# Patient Record
Sex: MALE | Race: WHITE | Employment: FULL TIME | URBAN - METROPOLITAN AREA
[De-identification: names, ages, dates, MRNs, and addresses within clinical notes are randomized per-mention and may not be internally consistent; named-entity substitution may affect disease eponyms.]

---

## 2023-01-16 ENCOUNTER — HOSPITAL ENCOUNTER (EMERGENCY)
Age: 63
Discharge: HOME | End: 2023-01-16
Payer: COMMERCIAL

## 2023-01-16 ENCOUNTER — APPOINTMENT (OUTPATIENT)
Dept: CT IMAGING | Age: 63
End: 2023-01-16
Payer: COMMERCIAL

## 2023-01-16 VITALS
TEMPERATURE: 98.1 F | OXYGEN SATURATION: 96 % | DIASTOLIC BLOOD PRESSURE: 66 MMHG | SYSTOLIC BLOOD PRESSURE: 136 MMHG | HEART RATE: 70 BPM | RESPIRATION RATE: 16 BRPM

## 2023-01-16 DIAGNOSIS — S30.1XXA CONTUSION OF FLANK AND BACK, INITIAL ENCOUNTER: Primary | ICD-10-CM

## 2023-01-16 LAB
A/G RATIO: 1.3 (ref 1.1–2.2)
ALBUMIN SERPL-MCNC: 4.1 G/DL (ref 3.4–5)
ALP BLD-CCNC: 74 U/L (ref 40–129)
ALT SERPL-CCNC: 19 U/L (ref 10–40)
ANION GAP SERPL CALCULATED.3IONS-SCNC: 9 MMOL/L (ref 3–16)
AST SERPL-CCNC: 21 U/L (ref 15–37)
BACTERIA: ABNORMAL /HPF
BASOPHILS ABSOLUTE: 0 K/UL (ref 0–0.2)
BASOPHILS RELATIVE PERCENT: 0.4 %
BILIRUB SERPL-MCNC: 0.3 MG/DL (ref 0–1)
BILIRUBIN URINE: NEGATIVE
BLOOD, URINE: NEGATIVE
BUN BLDV-MCNC: 22 MG/DL (ref 7–20)
CALCIUM SERPL-MCNC: 8.8 MG/DL (ref 8.3–10.6)
CHLORIDE BLD-SCNC: 105 MMOL/L (ref 99–110)
CLARITY: CLEAR
CO2: 29 MMOL/L (ref 21–32)
COLOR: YELLOW
CREAT SERPL-MCNC: 0.9 MG/DL (ref 0.8–1.3)
EOSINOPHILS ABSOLUTE: 0.1 K/UL (ref 0–0.6)
EOSINOPHILS RELATIVE PERCENT: 1 %
EPITHELIAL CELLS, UA: 1 /HPF (ref 0–5)
GFR SERPL CREATININE-BSD FRML MDRD: >60 ML/MIN/{1.73_M2}
GLUCOSE BLD-MCNC: 122 MG/DL (ref 70–99)
GLUCOSE URINE: NEGATIVE MG/DL
HCT VFR BLD CALC: 44.4 % (ref 40.5–52.5)
HEMOGLOBIN: 14.8 G/DL (ref 13.5–17.5)
HYALINE CASTS: 2 /LPF (ref 0–8)
KETONES, URINE: ABNORMAL MG/DL
LEUKOCYTE ESTERASE, URINE: ABNORMAL
LYMPHOCYTES ABSOLUTE: 1.3 K/UL (ref 1–5.1)
LYMPHOCYTES RELATIVE PERCENT: 14.5 %
MCH RBC QN AUTO: 30.6 PG (ref 26–34)
MCHC RBC AUTO-ENTMCNC: 33.4 G/DL (ref 31–36)
MCV RBC AUTO: 91.5 FL (ref 80–100)
MICROSCOPIC EXAMINATION: YES
MONOCYTES ABSOLUTE: 0.7 K/UL (ref 0–1.3)
MONOCYTES RELATIVE PERCENT: 7.8 %
NEUTROPHILS ABSOLUTE: 6.7 K/UL (ref 1.7–7.7)
NEUTROPHILS RELATIVE PERCENT: 76.3 %
NITRITE, URINE: NEGATIVE
PDW BLD-RTO: 14.4 % (ref 12.4–15.4)
PH UA: 5.5 (ref 5–8)
PLATELET # BLD: 212 K/UL (ref 135–450)
PMV BLD AUTO: 9 FL (ref 5–10.5)
POTASSIUM REFLEX MAGNESIUM: 4.8 MMOL/L (ref 3.5–5.1)
PROTEIN UA: ABNORMAL MG/DL
RBC # BLD: 4.86 M/UL (ref 4.2–5.9)
RBC UA: 1 /HPF (ref 0–4)
SODIUM BLD-SCNC: 143 MMOL/L (ref 136–145)
SPECIFIC GRAVITY UA: >=1.03 (ref 1–1.03)
TOTAL PROTEIN: 7.2 G/DL (ref 6.4–8.2)
URINE REFLEX TO CULTURE: ABNORMAL
URINE TYPE: ABNORMAL
UROBILINOGEN, URINE: 0.2 E.U./DL
WBC # BLD: 8.7 K/UL (ref 4–11)
WBC UA: 7 /HPF (ref 0–5)

## 2023-01-16 PROCEDURE — 99285 EMERGENCY DEPT VISIT HI MDM: CPT

## 2023-01-16 PROCEDURE — 85025 COMPLETE CBC W/AUTO DIFF WBC: CPT

## 2023-01-16 PROCEDURE — 74177 CT ABD & PELVIS W/CONTRAST: CPT

## 2023-01-16 PROCEDURE — 6370000000 HC RX 637 (ALT 250 FOR IP): Performed by: PHYSICIAN ASSISTANT

## 2023-01-16 PROCEDURE — 81001 URINALYSIS AUTO W/SCOPE: CPT

## 2023-01-16 PROCEDURE — 80053 COMPREHEN METABOLIC PANEL: CPT

## 2023-01-16 PROCEDURE — 36415 COLL VENOUS BLD VENIPUNCTURE: CPT

## 2023-01-16 PROCEDURE — 6360000004 HC RX CONTRAST MEDICATION: Performed by: PHYSICIAN ASSISTANT

## 2023-01-16 RX ORDER — DICLOFENAC SODIUM 75 MG/1
75 TABLET, DELAYED RELEASE ORAL 2 TIMES DAILY PRN
Qty: 20 TABLET | Refills: 0 | Status: SHIPPED | OUTPATIENT
Start: 2023-01-16

## 2023-01-16 RX ORDER — CYCLOBENZAPRINE HCL 10 MG
10 TABLET ORAL 3 TIMES DAILY PRN
Qty: 21 TABLET | Refills: 0 | Status: SHIPPED | OUTPATIENT
Start: 2023-01-16 | End: 2023-01-26

## 2023-01-16 RX ORDER — OXYCODONE HYDROCHLORIDE AND ACETAMINOPHEN 5; 325 MG/1; MG/1
1 TABLET ORAL ONCE
Status: COMPLETED | OUTPATIENT
Start: 2023-01-16 | End: 2023-01-16

## 2023-01-16 RX ADMIN — OXYCODONE AND ACETAMINOPHEN 1 TABLET: 5; 325 TABLET ORAL at 10:07

## 2023-01-16 RX ADMIN — IOPAMIDOL 75 ML: 755 INJECTION, SOLUTION INTRAVENOUS at 10:57

## 2023-01-16 ASSESSMENT — LIFESTYLE VARIABLES
HOW OFTEN DO YOU HAVE A DRINK CONTAINING ALCOHOL: NEVER
HOW MANY STANDARD DRINKS CONTAINING ALCOHOL DO YOU HAVE ON A TYPICAL DAY: PATIENT DOES NOT DRINK

## 2023-01-16 ASSESSMENT — PAIN - FUNCTIONAL ASSESSMENT: PAIN_FUNCTIONAL_ASSESSMENT: 0-10

## 2023-01-16 ASSESSMENT — PAIN SCALES - GENERAL: PAINLEVEL_OUTOF10: 10

## 2023-01-16 NOTE — ED PROVIDER NOTES
[facility]        Pt Name: Adrianan Schreiber  MRN: [id number]  [place] 1960  Date of evaluation: 1/16/2023  Provider: José Manuel Leyva PA-C  PCP: No primary care provider on file. Note Started: 10:04 AM EST 1/16/23      SAMANTHA. I have evaluated this patient. My supervising physician was available for consultation. CHIEF COMPLAINT       Chief Complaint   Patient presents with    Back Pain     Left sided back pain radiating to the front, was tying a rope this AM, rope broke and patient fell backwards from standing position onto concrete       HISTORY OF PRESENT ILLNESS: 1 or more Elements     History From: patient    Adrianna Schreiber is a 57 y.o. male who presents complaining of a left flank/left low back pain this AM.  Patient reports he was tying a rope to secure something when the rope snapped and he fell backward. He states he fell hard, directly onto his left flank on the concrete. He now has severe pain in the area, worse with range of motion, relieved by rest, radiates to the left lower quadrant with some abdominal pain. Denies midline back pain, leg pain, weakness, paresthesia, blood in urine, difficulty urinating or bowel movements. Denies prior back surgery or recent injections. Denies syncope, anticoagulation use, head injury, headache, neck pain. Nursing Notes were all reviewed and agreed with or any disagreements were addressed in the HPI. REVIEW OF SYSTEMS :      Review of Systems   All other systems reviewed and are negative. Positives and Pertinent negatives as per HPI. PAST MEDICAL HISTORY    has no past medical history on file. SURGICAL HISTORY   History reviewed. No pertinent surgical history. [address]       Discharge Medication List as of 1/16/2023  1:01 PM          ALLERGIES     Patient has no known allergies. FAMILYHISTORY     History reviewed. No pertinent family history. SOCIAL HISTORY       Social History     Tobacco Use    Smoking status: Never    Smokeless tobacco: Never       SCREENINGS        Billie Coma Scale  Eye Opening: Spontaneous  Best Verbal Response: Oriented  Best Motor Response: Obeys commands  [place] Coma Scale Score: 15                CIWA Assessment  BP: 136/66  Heart Rate: 70           PHYSICAL EXAM  1 or more Elements     ED Triage Vitals [01/16/23 0923]   BP Temp Temp src Heart Rate Resp SpO2 Height Weight   136/66 98.1 °F (36.7 °C) -- 70 16 96 % -- --       Physical Exam  Vitals and nursing note reviewed. Constitutional:       General: He is not in acute distress. Appearance: He is not ill-appearing or toxic-appearing. HENT:      Head: Normocephalic and atraumatic. Right Ear: External ear normal.      Left Ear: External ear normal.      Nose: Nose normal.      Mouth/Throat:      Mouth: Mucous membranes are moist.      Pharynx: Oropharynx is clear. Eyes:      Extraocular Movements: Extraocular movements intact. Conjunctiva/sclera: Conjunctivae normal.      Pupils: Pupils are equal, round, and reactive to light. Cardiovascular:      Rate and Rhythm: Normal rate and regular rhythm. Pulses: Normal pulses. Heart sounds: Normal heart sounds. Pulmonary:      Effort: Pulmonary effort is normal. No respiratory distress. Breath sounds: Normal breath sounds. Abdominal:      General: Abdomen is flat. Bowel sounds are normal. There is no distension. Palpations: Abdomen is soft. Tenderness: There is abdominal tenderness (LLQ). There is left CVA tenderness. There is no guarding or rebound. Musculoskeletal:         General: Normal range of motion. Cervical back: Normal range of motion and neck supple. Skin:     General: Skin is warm and dry. Capillary Refill: Capillary refill takes less than 2 seconds. Findings: Bruising (left flank) present. Neurological:      General: No focal deficit present. Mental Status: He is alert and oriented to person, place, and time. Cranial Nerves: No cranial nerve deficit. Sensory: No sensory deficit. Motor: No weakness. Coordination: Coordination normal.      Gait: Gait normal.   Psychiatric:         Mood and Affect: Mood normal.         Behavior: Behavior normal.           DIAGNOSTIC RESULTS   LABS:    Labs Reviewed   COMPREHENSIVE METABOLIC PANEL W/ REFLEX TO MG FOR LOW K - Abnormal; Notable for the following components:       Result Value    Glucose 122 (*)     BUN 22 (*)     All other components within normal limits   URINALYSIS WITH REFLEX TO CULTURE - Abnormal; Notable for the following components:    Ketones, Urine TRACE (*)     Protein, UA TRACE (*)     Leukocyte Esterase, Urine SMALL (*)     All other components within normal limits   MICROSCOPIC URINALYSIS - Abnormal; Notable for the following components:    WBC, UA 7 (*)     All other components within normal limits   CBC WITH AUTO DIFFERENTIAL       When ordered only abnormal lab results are displayed. All other labs were within normal range or not returned as of this dictation. EKG: When ordered, EKG's are interpreted by the Emergency Department Physician in the absence of a cardiologist.  Please see their note for interpretation of EKG. RADIOLOGY:   Non-plain film images such as CT, Ultrasound and MRI are read by the radiologist. Plain radiographic images are visualized and preliminarily interpreted by the ED Provider with the below findings:        Interpretation per the Radiologist below, if available at the time of this note:    CT ABDOMEN PELVIS W IV CONTRAST Additional Contrast? None   Final Result   Negative for acute abdominopelvic injury. No results found. No results found.     PROCEDURES   Unless otherwise noted below, none     Procedures    CRITICAL CARE TIME (.cctime)         EMERGENCY DEPARTMENT COURSE and DIFFERENTIAL DIAGNOSIS/MDM:   Vitals:    Vitals: 01/16/23 0923   BP: 136/66   Pulse: 70   Resp: 16   Temp: 98.1 °F (36.7 °C)   SpO2: 96%       Patient was given the following medications:  Medications   oxyCODONE-acetaminophen (PERCOCET) 5-325 MG per tablet 1 tablet (1 tablet Oral Given 1/16/23 1007)   iopamidol (ISOVUE-370) 76 % injection 75 mL (75 mLs IntraVENous Given 1/16/23 1057)             Is this patient to be included in the SEP-1 Core Measure due to severe sepsis or septic shock? No   Exclusion criteria - the patient is NOT to be included for SEP-1 Core Measure due to: Infection is not suspected    Chronic Conditions affecting care:    has no past medical history on file. CONSULTS: (Who and What was discussed)  None          Records Reviewed (Source):     CC/HPI Summary, DDx, ED Course, and Reassessment:     Emelia Aguiar is a 57 y.o. male who presents complaining of a left flank/left low back pain this AM.  Patient reports he was tying a rope to secure something when the rope snapped and he fell backward. He states he fell hard, directly onto his left flank on the concrete. He now has severe pain in the area, worse with range of motion, relieved by rest, radiates to the left lower quadrant with some abdominal pain. Denies midline back pain, leg pain, weakness, paresthesia, blood in urine, difficulty urinating or bowel movements. Denies prior back surgery or recent injections. Denies syncope, anticoagulation use, head injury, headache, neck pain    Shared medical decision making-patient was offered antibiotics for potential UTI with leukocytes in urine. He denies any urinary symptoms and states he was rather not taking antibiotics. Instructed him that if he develops symptoms or fever to return to the ER or follow-up with primary care. CT without acute traumatic finding. CBC, CMP unremarkable. No midline spinal tenderness, radiculopathy, weakness or paresthesia, incontinence, saddle paresthesia.     Patient instructed follow-up with primary care, return for any new or worsening symptoms. Disposition Considerations (tests considered but not done, Admit vs D/C, Shared Decision Making, Pt Expectation of Test or Tx.):        I am the Primary Clinician of Record. FINAL IMPRESSION      1. Contusion of flank and back, initial encounter          DISPOSITION/PLAN     DISPOSITION Decision To Discharge 01/16/2023 12:56:08 PM      PATIENT REFERRED TO:  [address] [place] [address]-Services  [phone]  In 2 days  Follow-up with primary care, they will contact you to set one up. Return for any new or worsening symptoms. DISCHARGE MEDICATIONS:  Discharge Medication List as of 1/16/2023  1:01 PM        START taking these medications    Details   cyclobenzaprine (FLEXERIL) 10 MG tablet Take 1 tablet by mouth 3 times daily as needed for Muscle spasms, Disp-21 tablet, R-0Print      diclofenac (VOLTAREN) 75 MG EC tablet Take 1 tablet by mouth 2 times daily as needed for Pain, Disp-20 tablet, R-0Print             DISCONTINUED MEDICATIONS:  Discharge Medication List as of 1/16/2023  1:01 PM                 (Please note that portions of this note were completed with a voice recognition program.  Efforts were made to edit the dictations but occasionally words are mis-transcribed. )    Nate De La Torre PA-C (electronically signed)            Nate De La Torre PA-C  01/16/23 6882

## 2023-02-28 ENCOUNTER — OFFICE VISIT (OUTPATIENT)
Dept: PRIMARY CARE CLINIC | Age: 63
End: 2023-02-28
Payer: COMMERCIAL

## 2023-02-28 VITALS
BODY MASS INDEX: 28.63 KG/M2 | RESPIRATION RATE: 16 BRPM | WEIGHT: 200 LBS | HEART RATE: 62 BPM | DIASTOLIC BLOOD PRESSURE: 72 MMHG | TEMPERATURE: 97 F | SYSTOLIC BLOOD PRESSURE: 122 MMHG | HEIGHT: 70 IN | OXYGEN SATURATION: 97 %

## 2023-02-28 DIAGNOSIS — K44.9 HIATAL HERNIA: ICD-10-CM

## 2023-02-28 DIAGNOSIS — Z12.83 SKIN CANCER SCREENING: ICD-10-CM

## 2023-02-28 DIAGNOSIS — K40.90 INDIRECT INGUINAL HERNIA: Primary | ICD-10-CM

## 2023-02-28 PROCEDURE — 1123F ACP DISCUSS/DSCN MKR DOCD: CPT | Performed by: FAMILY MEDICINE

## 2023-02-28 PROCEDURE — 99203 OFFICE O/P NEW LOW 30 MIN: CPT | Performed by: FAMILY MEDICINE

## 2023-02-28 SDOH — ECONOMIC STABILITY: INCOME INSECURITY: HOW HARD IS IT FOR YOU TO PAY FOR THE VERY BASICS LIKE FOOD, HOUSING, MEDICAL CARE, AND HEATING?: NOT HARD AT ALL

## 2023-02-28 SDOH — ECONOMIC STABILITY: HOUSING INSECURITY
IN THE LAST 12 MONTHS, WAS THERE A TIME WHEN YOU DID NOT HAVE A STEADY PLACE TO SLEEP OR SLEPT IN A SHELTER (INCLUDING NOW)?: NO

## 2023-02-28 SDOH — ECONOMIC STABILITY: FOOD INSECURITY: WITHIN THE PAST 12 MONTHS, YOU WORRIED THAT YOUR FOOD WOULD RUN OUT BEFORE YOU GOT MONEY TO BUY MORE.: NEVER TRUE

## 2023-02-28 SDOH — ECONOMIC STABILITY: FOOD INSECURITY: WITHIN THE PAST 12 MONTHS, THE FOOD YOU BOUGHT JUST DIDN'T LAST AND YOU DIDN'T HAVE MONEY TO GET MORE.: NEVER TRUE

## 2023-02-28 ASSESSMENT — PATIENT HEALTH QUESTIONNAIRE - PHQ9
SUM OF ALL RESPONSES TO PHQ QUESTIONS 1-9: 0
SUM OF ALL RESPONSES TO PHQ QUESTIONS 1-9: 0
SUM OF ALL RESPONSES TO PHQ9 QUESTIONS 1 & 2: 0
1. LITTLE INTEREST OR PLEASURE IN DOING THINGS: 0
SUM OF ALL RESPONSES TO PHQ QUESTIONS 1-9: 0
SUM OF ALL RESPONSES TO PHQ QUESTIONS 1-9: 0
2. FEELING DOWN, DEPRESSED OR HOPELESS: 0

## 2023-02-28 ASSESSMENT — ENCOUNTER SYMPTOMS
VOMITING: 0
DIARRHEA: 0
ABDOMINAL PAIN: 0
BLOOD IN STOOL: 1
SORE THROAT: 0
SHORTNESS OF BREATH: 0
NAUSEA: 0
COUGH: 0

## 2023-02-28 NOTE — PROGRESS NOTES
Chief Complaint   Patient presents with    New Patient     Np to establish     Other     Pt is c/o a knot on his groin x 6 months. Pt stated that it is not painful          Verneita Sis is a 70 y.o. male who presents complaining of nonpainful lump in his R groin x 6 months. Pt does do laborous work lifting heavy objects    Of note pt was in the ER last month secondary to an accidental fall while pulling a rope and hitting his lower back and in the ER had a CT scan abdomen/pelvis done which did show a R inguinal hernia    Pt denies any hx of HTN, diabetes or asthma     Surgical hx include hemorrhoid surgery    Pt is nonsmoker and denies any alcohol or illegal drug use    Pt did complete his COVID vaccine series    FHx positive for hiatal hernia (father); FHx negative for diabetes, CAD or colon/prostate cancer        CT scan abdomen/pelvis 01/16/2023  FINDINGS:   Lower Chest: There is a moderate-sized sliding-type hiatal hernia. There is   no consolidation or effusion. Organs: The liver, pancreas, spleen, kidneys and adrenals are unremarkable. GI/Bowel: There is no bowel dilatation, wall thickening or obstruction. Diverticular changes are scattered throughout the left hemicolon. The   appendix is normal.       Pelvis: The bladder and prostate are unremarkable. There is a 5.4 cm   indirect right inguinal hernia which contains the rightward aspect of the   bladder dome. Peritoneum/Retroperitoneum: There is no free air, free fluid or   intraperitoneal inflammatory change. There is no adenopathy. Bones/Soft Tissues: There is no acute fracture or aggressive osseous lesion. Review of Systems   Constitutional:  Negative for fatigue and fever. HENT:  Negative for nosebleeds and sore throat. Eyes:         Negative blurred vision or diplopia   Respiratory:  Negative for cough and shortness of breath. Cardiovascular:  Negative for chest pain, palpitations and leg swelling. Gastrointestinal:  Positive for blood in stool (at times). Negative for abdominal pain, diarrhea, nausea and vomiting. Negative melena; Positive indigestion at times with certain foods   Endocrine: Negative for polydipsia and polyuria. Genitourinary:  Negative for dysuria and hematuria. Musculoskeletal:  Positive for arthralgias (B/L knees and feet and takes OTC ibuprofen at times). Skin:  Negative for rash. Neurological:  Negative for dizziness, seizures, syncope, speech difficulty, weakness and headaches. Psychiatric/Behavioral:  Negative for dysphoric mood. The patient is not nervous/anxious. Vitals:    02/28/23 1059   BP: 122/72   Pulse: 62   Resp: 16   Temp: 97 °F (36.1 °C)   SpO2: 97%         Physical Exam  Vitals reviewed. Constitutional:       General: He is not in acute distress. HENT:      Mouth/Throat:      Mouth: Mucous membranes are moist.      Pharynx: Oropharynx is clear. Eyes:      General: No scleral icterus. Comments: Pink conjunctivae    Neck:      Thyroid: No thyromegaly. Comments: No carotid bruits noted B/L  Cardiovascular:      Heart sounds: Normal heart sounds. No murmur heard. No friction rub. No gallop. Pulmonary:      Effort: Pulmonary effort is normal.      Breath sounds: Normal breath sounds. Abdominal:      Palpations: Abdomen is soft. Tenderness: There is no abdominal tenderness. Genitourinary:     Penis: Normal.       Comments: No testicular masses noted; Positive moderate nontender R indirect inguinal hernia noted; No inguinal adenopathy noted B/L  Musculoskeletal:      Comments: No leg edema noted B/L   Lymphadenopathy:      Cervical: No cervical adenopathy. Skin:     Findings: No rash. Comments: Positive multiple sun spots and SK's noted on neck and B/L arms   Neurological:      Mental Status: He is alert.       Comments: Cranial nerves 2 - 12 grossly intact; Muscle strength 5/5 throughout   Psychiatric:         Mood and Affect: Mood normal.       ASSESSMENT AND PLAN    1. Indirect inguinal hernia  Pt with no associated tendernous to palpation and will Refer pt to Surgery for surgical repair and pt was told to go to the ER ASAP if you develop any acute pain at the site or nausea/vomiting! - Elli Henry MD, General Surgery, [facility]    2. Skin cancer screening  Will Refer pt to Dermatology for skin cancer screening and patient was told to always use sunscreen and a sunhat when exposed to the sun for extended periods   - Latosha Hooper MD, Dermatology, [facility]    3. Hiatal hernia  Pt denies any associated indigestion or chest pain      General Hayes MD      Return in about 2 months (around 4/28/2023).

## 2023-03-07 ENCOUNTER — OFFICE VISIT (OUTPATIENT)
Dept: SURGERY | Age: 63
End: 2023-03-07
Payer: COMMERCIAL

## 2023-03-07 VITALS — BODY MASS INDEX: 28.84 KG/M2 | WEIGHT: 201 LBS | DIASTOLIC BLOOD PRESSURE: 67 MMHG | SYSTOLIC BLOOD PRESSURE: 123 MMHG

## 2023-03-07 DIAGNOSIS — K40.20 BILATERAL INGUINAL HERNIA WITHOUT OBSTRUCTION OR GANGRENE, RECURRENCE NOT SPECIFIED: Primary | ICD-10-CM

## 2023-03-07 PROCEDURE — 99203 OFFICE O/P NEW LOW 30 MIN: CPT | Performed by: SURGERY

## 2023-03-07 ASSESSMENT — ENCOUNTER SYMPTOMS
CHEST TIGHTNESS: 0
APNEA: 0
EYE DISCHARGE: 0
ABDOMINAL PAIN: 1
ABDOMINAL DISTENTION: 0
BACK PAIN: 0
COLOR CHANGE: 0
EYE ITCHING: 0

## 2023-03-07 NOTE — PROGRESS NOTES
[facility]      PATIENT NAME: Jaxon Marroquin     TODAY'S DATE: 3/7/2023    Reason for Consult:  Hernia    Requesting Physician:  Dr. Perla Dandy:              The patient is a 70 y.o. male who presents with concerns of a groin bulge, RLQ, present about 6 mos, getting larger, more tender with local pressure recently. He has had symptoms for the past few weeks mostly. Associated symptoms are swelling and an enlarging bulge. The patient has not had prior hernia surgery. Lifts regularly, works as a . Past Medical History:    No past medical history on file. Past Surgical History:    No past surgical history on file. Current Medications:   No current facility-administered medications for this visit. Prior to Admission medications    Not on File        Allergies:  Patient has no known allergies. Social History:    reports that he has never smoked. He has never used smokeless tobacco. He reports that he does not drink alcohol and does not use drugs. Family History:        Problem Relation Age of Onset    No Known Problems Mother        REVIEW OF SYSTEMS:  Review of Systems   Constitutional:  Negative for activity change and appetite change. HENT:  Negative for congestion and dental problem. Eyes:  Negative for discharge and itching. Respiratory:  Negative for apnea and chest tightness. Cardiovascular:  Negative for chest pain and leg swelling. Gastrointestinal:  Positive for abdominal pain. Negative for abdominal distention. Endocrine: Negative for cold intolerance and heat intolerance. Genitourinary:  Negative for difficulty urinating and dysuria. Musculoskeletal:  Negative for arthralgias and back pain. Skin:  Negative for color change and pallor. Allergic/Immunologic: Negative for environmental allergies and food allergies. Neurological:  Negative for dizziness and facial asymmetry. Hematological:  Negative for adenopathy. Does not bruise/bleed easily. Psychiatric/Behavioral:  Negative for agitation and behavioral problems.       PHYSICAL EXAM:  VITALS:  Wt 201 lb (91.2 kg)   BMI 28.84 kg/m²   CONSTITUTIONAL:  alert, no apparent distress and normal weight  EYES:  sclera clear  ENT:  normocepalic, without obvious abnormality  NECK:  supple, symmetrical, trachea midline  LUNGS:  clear to auscultation  CARDIOVASCULAR:  regular rate and rhythm  ABDOMEN:  no scars, normal bowel sounds, soft, non-distended, non-tender, voluntary guarding absent, no masses palpated, no hepatosplenomegally and hernia present - bilateral inguinal, right larger  : normal scrotum and testicles  MUSCULOSKELETAL:  0+ edema lower extremities  NEUROLOGIC:  Mental Status Exam:  Level of Alertness:   awake  Orientation:   person, place, time  SKIN:  no bruising or bleeding and normal skin color, texture, turgor    DATA:     Latest Reference Range & Units 1/16/23 10:10   Sodium 136 - 145 mmol/L 143   Potassium 3.5 - 5.1 mmol/L 4.8   Chloride 99 - 110 mmol/L 105   CO2 21 - 32 mmol/L 29   BUN,BUNPL 7 - 20 mg/dL 22 (H)   Creatinine 0.8 - 1.3 mg/dL 0.9   Anion Gap 3 - 16  9   Est, Glom Filt Rate >60  >60   Glucose, Random 70 - 99 mg/dL 122 (H)   CALCIUM, SERUM, 527721 8.3 - 10.6 mg/dL 8.8   Total Protein 6.4 - 8.2 g/dL 7.2   Albumin 3.4 - 5.0 g/dL 4.1   Albumin/Globulin Ratio 1.1 - 2.2  1.3   Alk Phos 40 - 129 U/L 74   ALT 10 - 40 U/L 19   AST 15 - 37 U/L 21   BILIRUBIN TOTAL 0.0 - 1.0 mg/dL 0.3   WBC 4.0 - 11.0 K/uL 8.7   RBC 4.20 - 5.90 M/uL 4.86   Hemoglobin Quant 13.5 - 17.5 g/dL 14.8   Hematocrit 40.5 - 52.5 % 44.4   MCV 80.0 - 100.0 fL 91.5   MCH 26.0 - 34.0 pg 30.6   MCHC 31.0 - 36.0 g/dL 33.4   MPV 5.0 - 10.5 fL 9.0   RDW 12.4 - 15.4 % 14.4   Platelet Count 067 - 450 K/uL 212   (H): Data is abnormally high    Radiology Review:    EXAMINATION:   CT OF THE ABDOMEN AND PELVIS WITH CONTRAST 1/16/2023 10:44 am TECHNIQUE:   CT of the abdomen and pelvis was performed with the administration of   intravenous contrast. Multiplanar reformatted images are provided for review. Automated exposure control, iterative reconstruction, and/or weight based   adjustment of the mA/kV was utilized to reduce the radiation dose to as low   as reasonably achievable. COMPARISON:   None. HISTORY:   ORDERING SYSTEM PROVIDED HISTORY: fall, left flank pain, LLQ pain   TECHNOLOGIST PROVIDED HISTORY:   Additional Contrast?->None   Reason for exam:->fall, left flank pain, LLQ pain   Decision Support Exception - unselect if not a suspected or confirmed   emergency medical condition->Emergency Medical Condition (MA)   Reason for Exam: fall, left flank pain, LLQ pain       FINDINGS:   Lower Chest: There is a moderate-sized sliding-type hiatal hernia. There is   no consolidation or effusion. Organs: The liver, pancreas, spleen, kidneys and adrenals are unremarkable. GI/Bowel: There is no bowel dilatation, wall thickening or obstruction. Diverticular changes are scattered throughout the left hemicolon. The   appendix is normal.       Pelvis: The bladder and prostate are unremarkable. There is a 5.4 cm   indirect right inguinal hernia which contains the rightward aspect of the   bladder dome. Peritoneum/Retroperitoneum: There is no free air, free fluid or   intraperitoneal inflammatory change. There is no adenopathy. Bones/Soft Tissues: There is no acute fracture or aggressive osseous lesion. Impression   Negative for acute abdominopelvic injury. IMPRESSION/RECOMMENDATIONS:    Bilateral inguinal hernia. Right symptomatic. Laparoscopic Bilateral inguinal hernia repair will be scheduled. The plan for surgery has been reviewed in detail today. Risks and benefits have been reviewed, and all questions answered.     Will schedule as outpt at [facility], thank you       Danielle Medrano MD

## 2023-03-13 NOTE — PROGRESS NOTES
Name_______________________________________Printed:____________________  Date and time of surgery____3/17/2023   1130____________________Arrival Time:____1000  MAIN____________   1. The instructions given regarding when and if a patient needs to stop oral intake prior to surgery varies. Follow the specific instructions you were given                  __X_Nothing to eat or to drink after Midnight the night before.                   ____Carbo loading or instructions will be given to select patients-if you have been given those instructions -please do the following                           The evening before your surgery after dinner before midnight drink 40 ounces of gatorade. If you are diabetic use sugar free. The morning of surgery drink 40 ounces of water. This needs to be finished 3 hours prior to your surgery start time. 2. Take the following pills with a small sip of water on the morning of surgery___________________________________________________                  Do not take blood pressure medications ending in pril or sartan the john prior to surgery or the morning of surgery. Dr Jose Wolfe patient are not to take any medications the AM of surgery. 3. Aspirin, Ibuprofen, Advil, Naproxen, Vitamin E and other Anti-inflammatory products and supplements should be stopped for 5 -7days before surgery or as directed by your physician. 4. Check with your Doctor regarding stopping Plavix, Coumadin,Eliquis, Lovenox,Effient,Pradaxa,Xarelto, Fragmin or other blood thinners and follow their instructions. 5. Do not smoke, and do not drink any alcoholic beverages 24 hours prior to surgery. This includes NA Beer. Refrain from the usage of any recreational drugs. 6. You may brush your teeth and gargle the morning of surgery. DO NOT SWALLOW WATER   7. You MUST make arrangements for a responsible adult to stay on site while you are here and take you home after your surgery.  You will not be allowed to leave alone or drive yourself home. It is strongly suggested someone stay with you the first 24 hrs. Your surgery will be cancelled if you do not have a ride home. 8. A parent/legal guardian must accompany a child scheduled for surgery and plan to stay at the hospital until the child is discharged. Please do not bring other children with you. 9. Please wear simple, loose fitting clothing to the hospital.  Antonino Tijerina not bring valuables (money, credit cards, checkbooks, etc.) Do not wear any makeup (including no eye makeup) or nail polish on your fingers or toes. 10. DO NOT wear any jewelry or piercings on day of surgery. All body piercing jewelry must be removed. 11. If you have ___dentures, they will be removed before going to the OR; we will provide you a container. If you wear ___contact lenses or ___glasses, they will be removed; please bring a case for them. 12. Please see your family doctor/pediatrician for a history & physical and/or concerning medications. Bring any test results/reports from your physician's office. PCP__________________Phone___________H&P Appt. Date________             13 If you  have a Living Will and Durable Power of  for Healthcare, please bring in a copy. 15. Notify your Surgeon if you develop any illness between now and surgery  time, cough, cold, fever, sore throat, nausea, vomiting, etc.  Please notify your surgeon if you experience dizziness, shortness of breath or blurred vision between now & the time of your surgery             15.X  DO NOT shave your operative site 96 hours prior to surgery. For face & neck surgery, men may use an electric razor 48 hours prior to surgery. 16. Shower the night before or morning of surgery using an antibacterial soap or as you have been instructed. 17. To provide excellent care visitors will be limited to one in the room at any given time.              18.  Please bring picture ID and insurance card. 19.  Visit our web site for additional information:  [email or web address]              20.During flu season no children under the age of 15 are permitted in the hospital for the safety of all patients. 21. If you take a long acting insulin in the evening only  take half of your usual  dose the night  before your procedure              22. If you use a c-pap please bring DOS if staying overnight,             23.For your convenience [facility] has a pharmacy on site to fill your prescriptions. 24. If you use oxygen and have a portable tank please bring it  with you the DOS             25. Bring a complete list of all your medications with name and dose include any supplements. 26. Other__________________________________________   *Please call pre admission testing if you any further questions   [address]    [place] [id number]. [facility]  [phone]   [address] [place] [address]       VISITOR POLICY(subject to change)    Current policy is 2 visitors per patient. No children. Mask is  at the discretion of the facility. Visiting hours are 8a-8p. Overnight visitors will be at the discretion of the nurse. All policies subject to change. All above information reviewed with patient in person or by phone. Patient verbalizes understanding. All questions and concerns addressed.                                                                                                  Patient/Rep____PATIENT________________                                                                                                                                    PRE OP INSTRUCTIONS

## 2023-03-17 ENCOUNTER — ANESTHESIA EVENT (OUTPATIENT)
Dept: OPERATING ROOM | Age: 63
End: 2023-03-17
Payer: COMMERCIAL

## 2023-03-17 ENCOUNTER — HOSPITAL ENCOUNTER (OUTPATIENT)
Age: 63
Setting detail: OUTPATIENT SURGERY
Discharge: HOME | End: 2023-03-17
Attending: SURGERY | Admitting: SURGERY
Payer: COMMERCIAL

## 2023-03-17 ENCOUNTER — ANESTHESIA (OUTPATIENT)
Dept: OPERATING ROOM | Age: 63
End: 2023-03-17
Payer: COMMERCIAL

## 2023-03-17 VITALS
DIASTOLIC BLOOD PRESSURE: 78 MMHG | SYSTOLIC BLOOD PRESSURE: 110 MMHG | TEMPERATURE: 97.8 F | BODY MASS INDEX: 27.98 KG/M2 | RESPIRATION RATE: 15 BRPM | HEIGHT: 70 IN | OXYGEN SATURATION: 96 % | HEART RATE: 46 BPM | WEIGHT: 195.44 LBS

## 2023-03-17 DIAGNOSIS — K40.20 NON-RECURRENT BILATERAL INGUINAL HERNIA WITHOUT OBSTRUCTION OR GANGRENE: Primary | ICD-10-CM

## 2023-03-17 PROCEDURE — 2580000003 HC RX 258: Performed by: ANESTHESIOLOGY

## 2023-03-17 PROCEDURE — 3600000004 HC SURGERY LEVEL 4 BASE: Performed by: SURGERY

## 2023-03-17 PROCEDURE — 2500000003 HC RX 250 WO HCPCS: Performed by: SURGERY

## 2023-03-17 PROCEDURE — 6370000000 HC RX 637 (ALT 250 FOR IP): Performed by: ANESTHESIOLOGY

## 2023-03-17 PROCEDURE — 6360000002 HC RX W HCPCS: Performed by: NURSE ANESTHETIST, CERTIFIED REGISTERED

## 2023-03-17 PROCEDURE — 6360000002 HC RX W HCPCS: Performed by: SURGERY

## 2023-03-17 PROCEDURE — C1713 ANCHOR/SCREW BN/BN,TIS/BN: HCPCS | Performed by: SURGERY

## 2023-03-17 PROCEDURE — A4217 STERILE WATER/SALINE, 500 ML: HCPCS | Performed by: SURGERY

## 2023-03-17 PROCEDURE — 2580000003 HC RX 258: Performed by: SURGERY

## 2023-03-17 PROCEDURE — 2709999900 HC NON-CHARGEABLE SUPPLY: Performed by: SURGERY

## 2023-03-17 PROCEDURE — 2500000003 HC RX 250 WO HCPCS: Performed by: NURSE ANESTHETIST, CERTIFIED REGISTERED

## 2023-03-17 PROCEDURE — C1781 MESH (IMPLANTABLE): HCPCS | Performed by: SURGERY

## 2023-03-17 PROCEDURE — 7100000001 HC PACU RECOVERY - ADDTL 15 MIN: Performed by: SURGERY

## 2023-03-17 PROCEDURE — 7100000010 HC PHASE II RECOVERY - FIRST 15 MIN: Performed by: SURGERY

## 2023-03-17 PROCEDURE — 7100000000 HC PACU RECOVERY - FIRST 15 MIN: Performed by: SURGERY

## 2023-03-17 PROCEDURE — 7100000011 HC PHASE II RECOVERY - ADDTL 15 MIN: Performed by: SURGERY

## 2023-03-17 PROCEDURE — 6370000000 HC RX 637 (ALT 250 FOR IP): Performed by: SURGERY

## 2023-03-17 PROCEDURE — 3700000000 HC ANESTHESIA ATTENDED CARE: Performed by: SURGERY

## 2023-03-17 PROCEDURE — 49650 LAP ING HERNIA REPAIR INIT: CPT | Performed by: SURGERY

## 2023-03-17 PROCEDURE — 2580000003 HC RX 258: Performed by: NURSE ANESTHETIST, CERTIFIED REGISTERED

## 2023-03-17 PROCEDURE — 3600000014 HC SURGERY LEVEL 4 ADDTL 15MIN: Performed by: SURGERY

## 2023-03-17 PROCEDURE — 3700000001 HC ADD 15 MINUTES (ANESTHESIA): Performed by: SURGERY

## 2023-03-17 PROCEDURE — 2720000010 HC SURG SUPPLY STERILE: Performed by: SURGERY

## 2023-03-17 DEVICE — MESH HERN L W10.8XL16CM R INGUINAL WHT POLYPR MFIL: Type: IMPLANTABLE DEVICE | Site: INGUINAL | Status: FUNCTIONAL

## 2023-03-17 DEVICE — SYSTEM PERM FIX L37CM 15 FAST CAPSUR: Type: IMPLANTABLE DEVICE | Site: INGUINAL | Status: FUNCTIONAL

## 2023-03-17 DEVICE — MESH HERN L W10.8XL16CM L INGUINAL WHT POLYPR MFIL: Type: IMPLANTABLE DEVICE | Site: ABDOMEN | Status: FUNCTIONAL

## 2023-03-17 RX ORDER — ONDANSETRON 2 MG/ML
INJECTION INTRAMUSCULAR; INTRAVENOUS PRN
Status: DISCONTINUED | OUTPATIENT
Start: 2023-03-17 | End: 2023-03-17 | Stop reason: SDUPTHER

## 2023-03-17 RX ORDER — SODIUM CHLORIDE 0.9 % (FLUSH) 0.9 %
5-40 SYRINGE (ML) INJECTION EVERY 12 HOURS SCHEDULED
Status: DISCONTINUED | OUTPATIENT
Start: 2023-03-17 | End: 2023-03-17 | Stop reason: HOSPADM

## 2023-03-17 RX ORDER — SODIUM CHLORIDE 9 MG/ML
INJECTION, SOLUTION INTRAVENOUS CONTINUOUS PRN
Status: DISCONTINUED | OUTPATIENT
Start: 2023-03-17 | End: 2023-03-17 | Stop reason: SDUPTHER

## 2023-03-17 RX ORDER — MAGNESIUM HYDROXIDE 1200 MG/15ML
LIQUID ORAL CONTINUOUS PRN
Status: COMPLETED | OUTPATIENT
Start: 2023-03-17 | End: 2023-03-17

## 2023-03-17 RX ORDER — DEXAMETHASONE SODIUM PHOSPHATE 4 MG/ML
INJECTION, SOLUTION INTRA-ARTICULAR; INTRALESIONAL; INTRAMUSCULAR; INTRAVENOUS; SOFT TISSUE PRN
Status: DISCONTINUED | OUTPATIENT
Start: 2023-03-17 | End: 2023-03-17 | Stop reason: SDUPTHER

## 2023-03-17 RX ORDER — HYDROMORPHONE HCL 110MG/55ML
0.25 PATIENT CONTROLLED ANALGESIA SYRINGE INTRAVENOUS EVERY 5 MIN PRN
Status: DISCONTINUED | OUTPATIENT
Start: 2023-03-17 | End: 2023-03-17 | Stop reason: HOSPADM

## 2023-03-17 RX ORDER — PROPOFOL 10 MG/ML
INJECTION, EMULSION INTRAVENOUS PRN
Status: DISCONTINUED | OUTPATIENT
Start: 2023-03-17 | End: 2023-03-17 | Stop reason: SDUPTHER

## 2023-03-17 RX ORDER — ROCURONIUM BROMIDE 10 MG/ML
INJECTION, SOLUTION INTRAVENOUS PRN
Status: DISCONTINUED | OUTPATIENT
Start: 2023-03-17 | End: 2023-03-17 | Stop reason: SDUPTHER

## 2023-03-17 RX ORDER — LIDOCAINE HYDROCHLORIDE 10 MG/ML
1 INJECTION, SOLUTION EPIDURAL; INFILTRATION; INTRACAUDAL; PERINEURAL
Status: DISCONTINUED | OUTPATIENT
Start: 2023-03-17 | End: 2023-03-17 | Stop reason: HOSPADM

## 2023-03-17 RX ORDER — ACETAMINOPHEN 325 MG/1
650 TABLET ORAL ONCE
Status: COMPLETED | OUTPATIENT
Start: 2023-03-17 | End: 2023-03-17

## 2023-03-17 RX ORDER — FENTANYL CITRATE 50 UG/ML
INJECTION, SOLUTION INTRAMUSCULAR; INTRAVENOUS PRN
Status: DISCONTINUED | OUTPATIENT
Start: 2023-03-17 | End: 2023-03-17 | Stop reason: SDUPTHER

## 2023-03-17 RX ORDER — KETOROLAC TROMETHAMINE 30 MG/ML
INJECTION, SOLUTION INTRAMUSCULAR; INTRAVENOUS PRN
Status: DISCONTINUED | OUTPATIENT
Start: 2023-03-17 | End: 2023-03-17 | Stop reason: SDUPTHER

## 2023-03-17 RX ORDER — SODIUM CHLORIDE 0.9 % (FLUSH) 0.9 %
5-40 SYRINGE (ML) INJECTION PRN
Status: DISCONTINUED | OUTPATIENT
Start: 2023-03-17 | End: 2023-03-17 | Stop reason: HOSPADM

## 2023-03-17 RX ORDER — SODIUM CHLORIDE 9 MG/ML
INJECTION, SOLUTION INTRAVENOUS CONTINUOUS
Status: DISCONTINUED | OUTPATIENT
Start: 2023-03-17 | End: 2023-03-17 | Stop reason: HOSPADM

## 2023-03-17 RX ORDER — BUPIVACAINE HYDROCHLORIDE AND EPINEPHRINE 5; 5 MG/ML; UG/ML
INJECTION, SOLUTION EPIDURAL; INTRACAUDAL; PERINEURAL
Status: COMPLETED | OUTPATIENT
Start: 2023-03-17 | End: 2023-03-17

## 2023-03-17 RX ORDER — SODIUM CHLORIDE 9 MG/ML
INJECTION, SOLUTION INTRAVENOUS PRN
Status: DISCONTINUED | OUTPATIENT
Start: 2023-03-17 | End: 2023-03-17 | Stop reason: HOSPADM

## 2023-03-17 RX ORDER — OXYCODONE HYDROCHLORIDE 5 MG/1
5 TABLET ORAL ONCE
Status: COMPLETED | OUTPATIENT
Start: 2023-03-17 | End: 2023-03-17

## 2023-03-17 RX ORDER — HYDROMORPHONE HCL 110MG/55ML
PATIENT CONTROLLED ANALGESIA SYRINGE INTRAVENOUS PRN
Status: DISCONTINUED | OUTPATIENT
Start: 2023-03-17 | End: 2023-03-17 | Stop reason: SDUPTHER

## 2023-03-17 RX ORDER — ESMOLOL HYDROCHLORIDE 10 MG/ML
INJECTION INTRAVENOUS PRN
Status: DISCONTINUED | OUTPATIENT
Start: 2023-03-17 | End: 2023-03-17 | Stop reason: SDUPTHER

## 2023-03-17 RX ORDER — HYDROMORPHONE HCL 110MG/55ML
0.5 PATIENT CONTROLLED ANALGESIA SYRINGE INTRAVENOUS EVERY 5 MIN PRN
Status: DISCONTINUED | OUTPATIENT
Start: 2023-03-17 | End: 2023-03-17 | Stop reason: HOSPADM

## 2023-03-17 RX ORDER — DEXMEDETOMIDINE HYDROCHLORIDE 100 UG/ML
INJECTION, SOLUTION INTRAVENOUS PRN
Status: DISCONTINUED | OUTPATIENT
Start: 2023-03-17 | End: 2023-03-17 | Stop reason: SDUPTHER

## 2023-03-17 RX ORDER — LIDOCAINE HYDROCHLORIDE 20 MG/ML
INJECTION, SOLUTION EPIDURAL; INFILTRATION; INTRACAUDAL; PERINEURAL PRN
Status: DISCONTINUED | OUTPATIENT
Start: 2023-03-17 | End: 2023-03-17 | Stop reason: SDUPTHER

## 2023-03-17 RX ORDER — GLYCOPYRROLATE 0.2 MG/ML
INJECTION INTRAMUSCULAR; INTRAVENOUS PRN
Status: DISCONTINUED | OUTPATIENT
Start: 2023-03-17 | End: 2023-03-17 | Stop reason: SDUPTHER

## 2023-03-17 RX ORDER — HYDROCODONE BITARTRATE AND ACETAMINOPHEN 5; 325 MG/1; MG/1
1 TABLET ORAL EVERY 4 HOURS PRN
Qty: 15 TABLET | Refills: 0 | Status: SHIPPED | OUTPATIENT
Start: 2023-03-17 | End: 2023-03-24

## 2023-03-17 RX ORDER — SUCCINYLCHOLINE/SOD CL,ISO/PF 200MG/10ML
SYRINGE (ML) INTRAVENOUS PRN
Status: DISCONTINUED | OUTPATIENT
Start: 2023-03-17 | End: 2023-03-17 | Stop reason: SDUPTHER

## 2023-03-17 RX ORDER — ONDANSETRON 2 MG/ML
4 INJECTION INTRAMUSCULAR; INTRAVENOUS
Status: DISCONTINUED | OUTPATIENT
Start: 2023-03-17 | End: 2023-03-17 | Stop reason: HOSPADM

## 2023-03-17 RX ORDER — MINERAL OIL 471.99 G/472ML
OIL TOPICAL
Status: COMPLETED | OUTPATIENT
Start: 2023-03-17 | End: 2023-03-17

## 2023-03-17 RX ORDER — LABETALOL HYDROCHLORIDE 5 MG/ML
5 INJECTION, SOLUTION INTRAVENOUS
Status: DISCONTINUED | OUTPATIENT
Start: 2023-03-17 | End: 2023-03-17 | Stop reason: HOSPADM

## 2023-03-17 RX ADMIN — GLYCOPYRROLATE 0.2 MG: 0.2 INJECTION INTRAMUSCULAR; INTRAVENOUS at 09:46

## 2023-03-17 RX ADMIN — SODIUM CHLORIDE: 9 INJECTION, SOLUTION INTRAVENOUS at 09:18

## 2023-03-17 RX ADMIN — ESMOLOL HYDROCHLORIDE 10 MG: 10 INJECTION, SOLUTION INTRAVENOUS at 09:24

## 2023-03-17 RX ADMIN — HYDROMORPHONE HYDROCHLORIDE 1 MG: 2 INJECTION, SOLUTION INTRAMUSCULAR; INTRAVENOUS; SUBCUTANEOUS at 10:09

## 2023-03-17 RX ADMIN — HYDROMORPHONE HYDROCHLORIDE 0.5 MG: 2 INJECTION, SOLUTION INTRAMUSCULAR; INTRAVENOUS; SUBCUTANEOUS at 10:45

## 2023-03-17 RX ADMIN — SODIUM CHLORIDE: 9 INJECTION, SOLUTION INTRAVENOUS at 08:44

## 2023-03-17 RX ADMIN — FENTANYL CITRATE 50 MCG: 50 INJECTION, SOLUTION INTRAMUSCULAR; INTRAVENOUS at 09:21

## 2023-03-17 RX ADMIN — ONDANSETRON 4 MG: 2 INJECTION INTRAMUSCULAR; INTRAVENOUS at 10:32

## 2023-03-17 RX ADMIN — HYDROMORPHONE HYDROCHLORIDE 0.5 MG: 2 INJECTION, SOLUTION INTRAMUSCULAR; INTRAVENOUS; SUBCUTANEOUS at 10:34

## 2023-03-17 RX ADMIN — CEFAZOLIN 2000 MG: 2 INJECTION, POWDER, FOR SOLUTION INTRAMUSCULAR; INTRAVENOUS at 09:14

## 2023-03-17 RX ADMIN — FENTANYL CITRATE 50 MCG: 50 INJECTION, SOLUTION INTRAMUSCULAR; INTRAVENOUS at 10:03

## 2023-03-17 RX ADMIN — PROPOFOL 200 MG: 10 INJECTION, EMULSION INTRAVENOUS at 09:26

## 2023-03-17 RX ADMIN — LIDOCAINE HYDROCHLORIDE 100 MG: 20 INJECTION, SOLUTION EPIDURAL; INFILTRATION; INTRACAUDAL; PERINEURAL at 09:24

## 2023-03-17 RX ADMIN — SUGAMMADEX 200 MG: 100 INJECTION, SOLUTION INTRAVENOUS at 10:32

## 2023-03-17 RX ADMIN — ESMOLOL HYDROCHLORIDE 10 MG: 10 INJECTION, SOLUTION INTRAVENOUS at 10:34

## 2023-03-17 RX ADMIN — DEXMEDETOMIDINE HYDROCHLORIDE 4 MCG: 100 INJECTION, SOLUTION INTRAVENOUS at 09:48

## 2023-03-17 RX ADMIN — DEXMEDETOMIDINE HYDROCHLORIDE 4 MCG: 100 INJECTION, SOLUTION INTRAVENOUS at 09:51

## 2023-03-17 RX ADMIN — ROCURONIUM BROMIDE 10 MG: 10 INJECTION, SOLUTION INTRAVENOUS at 09:24

## 2023-03-17 RX ADMIN — OXYCODONE HYDROCHLORIDE 5 MG: 5 TABLET ORAL at 11:41

## 2023-03-17 RX ADMIN — DEXAMETHASONE SODIUM PHOSPHATE 4 MG: 4 INJECTION, SOLUTION INTRAMUSCULAR; INTRAVENOUS at 09:39

## 2023-03-17 RX ADMIN — GLYCOPYRROLATE 0.2 MG: 0.2 INJECTION INTRAMUSCULAR; INTRAVENOUS at 09:41

## 2023-03-17 RX ADMIN — DEXMEDETOMIDINE HYDROCHLORIDE 4 MCG: 100 INJECTION, SOLUTION INTRAVENOUS at 10:09

## 2023-03-17 RX ADMIN — KETOROLAC TROMETHAMINE 15 MG: 30 INJECTION, SOLUTION INTRAMUSCULAR; INTRAVENOUS at 10:33

## 2023-03-17 RX ADMIN — Medication 140 MG: at 09:24

## 2023-03-17 RX ADMIN — ACETAMINOPHEN 650 MG: 325 TABLET ORAL at 08:49

## 2023-03-17 RX ADMIN — SODIUM CHLORIDE: 9 INJECTION, SOLUTION INTRAVENOUS at 09:53

## 2023-03-17 ASSESSMENT — PAIN DESCRIPTION - LOCATION: LOCATION: GROIN

## 2023-03-17 ASSESSMENT — PAIN SCALES - GENERAL: PAINLEVEL_OUTOF10: 3

## 2023-03-17 ASSESSMENT — PAIN - FUNCTIONAL ASSESSMENT: PAIN_FUNCTIONAL_ASSESSMENT: 0-10

## 2023-03-17 ASSESSMENT — PAIN DESCRIPTION - ORIENTATION: ORIENTATION: RIGHT;LEFT

## 2023-03-17 NOTE — ANESTHESIA POSTPROCEDURE EVALUATION
Department of Anesthesiology  Postprocedure Note    Patient: Nikolay Harrell  MRN: [id number]  YOB: 1960  Date of evaluation: 3/17/2023      Procedure Summary     Date: 03/17/23 Room / Location: [address]    Anesthesia Start: 5119 Anesthesia Stop: 2819    Procedure: [address] (Bilateral: Abdomen) Diagnosis:       Bilateral inguinal hernia without obstruction or gangrene, recurrence not specified      (K40.20 BILATERAL INGUINAL HERNIAS)    Surgeons: Martin Puentes MD Responsible Provider: Thomas Brooks MD    Anesthesia Type: general ASA Status: 2          Anesthesia Type: No value filed.     Felipa Phase I: Felipa Score: 10    Felipa Phase II: Felipa Score: 10      Anesthesia Post Evaluation    Patient location during evaluation: PACU  Patient participation: complete - patient participated  Level of consciousness: awake  Airway patency: patent  Nausea & Vomiting: no vomiting  Complications: no  Cardiovascular status: hemodynamically stable  Respiratory status: acceptable  Hydration status: euvolemic  Multimodal analgesia pain management approach

## 2023-03-17 NOTE — ANESTHESIA PRE PROCEDURE
Department of Anesthesiology  Preprocedure Note       Name:  Subhash Yi   Age:  70 y.o.  :  1960                                          MRN:  [id number]         Date:  3/17/2023      Surgeon: Shari Jaramillo):  Quyen Becker MD    Procedure: Procedure(s):  LAPAROSCOPIC BILATERAL INGUINAL HERNIAS    Medications prior to admission:   Prior to Admission medications    Medication Sig Start Date End Date Taking? Authorizing Provider   Multiple Vitamin (MULTIVITAMIN ADULT PO) Take by mouth   Yes Historical Provider, MD       Current medications:    No current facility-administered medications for this encounter. Allergies:  No Known Allergies    Problem List:  There is no problem list on file for this patient. Past Medical History:  History reviewed. No pertinent past medical history. Past Surgical History:        Procedure Laterality Date    HEMORRHOID SURGERY         Social History:    Social History     Tobacco Use    Smoking status: Former     Packs/day: 1.00     Years: 4.00     Pack years: 4.00     Types: Cigarettes    Smokeless tobacco: Never   Substance Use Topics    Alcohol use: Never                                Counseling given: Not Answered      Vital Signs (Current):   Vitals:    23 1426 23 0815 23 0827   BP:   127/74   Pulse:  58    Resp:  16    Temp:  96.8 °F (36 °C)    TempSrc:  Temporal    SpO2:  97%    Weight: 200 lb (90.7 kg) 195 lb 7 oz (88.6 kg)    Height: 5' 10\" (1.778 m)                                                BP Readings from Last 3 Encounters:   23 127/74   23 123/67   23 122/72       NPO Status:                                                                                 BMI:   Wt Readings from Last 3 Encounters:   23 195 lb 7 oz (88.6 kg)   23 201 lb (91.2 kg)   23 200 lb (90.7 kg)     Body mass index is 28.04 kg/m².     CBC:   Lab Results   Component Value Date/Time    WBC 8.7 2023 10:10 AM RBC 4.86 01/16/2023 10:10 AM    HGB 14.8 01/16/2023 10:10 AM    HCT 44.4 01/16/2023 10:10 AM    MCV 91.5 01/16/2023 10:10 AM    RDW 14.4 01/16/2023 10:10 AM     01/16/2023 10:10 AM       CMP:   Lab Results   Component Value Date/Time     01/16/2023 10:10 AM    K 4.8 01/16/2023 10:10 AM     01/16/2023 10:10 AM    CO2 29 01/16/2023 10:10 AM    BUN 22 01/16/2023 10:10 AM    CREATININE 0.9 01/16/2023 10:10 AM    AGRATIO 1.3 01/16/2023 10:10 AM    LABGLOM >60 01/16/2023 10:10 AM    GLUCOSE 122 01/16/2023 10:10 AM    PROT 7.2 01/16/2023 10:10 AM    CALCIUM 8.8 01/16/2023 10:10 AM    BILITOT 0.3 01/16/2023 10:10 AM    ALKPHOS 74 01/16/2023 10:10 AM    AST 21 01/16/2023 10:10 AM    ALT 19 01/16/2023 10:10 AM       POC Tests: No results for input(s): POCGLU, POCNA, POCK, POCCL, POCBUN, POCHEMO, POCHCT in the last 72 hours. Coags: No results found for: PROTIME, INR, APTT    HCG (If Applicable): No results found for: PREGTESTUR, PREGSERUM, HCG, HCGQUANT     ABGs: No results found for: PHART, PO2ART, PUB3QKF, FNZ0BBI, BEART, I6MANCQG     Type & Screen (If Applicable):  No results found for: LABABO, LABRH    Drug/Infectious Status (If Applicable):  No results found for: HIV, HEPCAB    COVID-19 Screening (If Applicable): No results found for: COVID19        Anesthesia Evaluation  Patient summary reviewed and Nursing notes reviewed no history of anesthetic complications:   Airway: Mallampati: II  TM distance: >3 FB   Neck ROM: full  Mouth opening: > = 3 FB   Dental:      Comment: Poor dentition. Top front tooth broken. Many broken molars.     Pulmonary:       (-) rhonchi and wheezes                          ROS comment: Former smoker   Cardiovascular:  Exercise tolerance: good (>4 METS),       (-) CABG/stent, dysrhythmias and  angina      Rhythm: regular  Rate: abnormal                    Neuro/Psych:      (-) seizures, TIA and CVA           GI/Hepatic/Renal:        (-) GERD       Endo/Other:        (-) blood dyscrasia               Abdominal:             Vascular:     - DVT and PE. Other Findings:           Anesthesia Plan      general     ASA 2     ([place] for intubation)  Induction: intravenous. MIPS: Postoperative opioids intended and Prophylactic antiemetics administered. Anesthetic plan and risks discussed with patient. Plan discussed with CRNA.                     Gus Lozano MD   3/17/2023

## 2023-03-17 NOTE — BRIEF OP NOTE
Brief Postoperative Note      Patient: Temo Cazares  YOB: 1960  MRN: [id number]    Date of Procedure: 3/17/2023    Pre-Op Diagnosis: K40.20 BILATERAL INGUINAL HERNIAS    Post-Op Diagnosis: Same       Procedure(s):  LAPAROSCOPIC BILATERAL INGUINAL HERNIAS WITH MESH    Surgeon(s):  Santiago Lafleur MD    Assistant:  Surgical Assistant: Melva Tapia    Anesthesia: General    Estimated Blood Loss (mL): Minimal    Complications: None    Specimens:   * No specimens in log *    Implants:  Implant Name Type Inv.  Item Serial No.  Lot No. LRB No. Used Action   SYSTEM PERM FIX L37CM 15 FAST CAPSUR - [id number]  SYSTEM PERM FIX L37CM 15 FAST CAPSUR  BARD DAVOL-WD [id number] N/A 1 Implanted   MESH HARVINDER L W10.4NS79JW R INGUINAL WHT POLYPR MFIL - [id number]  MESH HARVINDER L W10.1ZN08OQ R INGUINAL WHT POLYPR MFIL  BARD DAVOL-WD [id number] Right 1 Implanted   MESH HARVINDER L W10.2NX06AH L INGUINAL WHT POLYPR MFIL - [id number]  MESH HARVINDER L W10.6KB32EI L INGUINAL WHT POLYPR MFIL  BARD DAVOL-WD [id number] 1 Implanted         Drains: * No LDAs found *    Findings: BIH repair completed    Electronically signed by Santiago Lafleur MD on 3/17/2023 at 10:28 AM

## 2023-03-17 NOTE — OP NOTE
[facility]                     [facility], [address] [place] [address]                                OPERATIVE REPORT    PATIENT NAME: Darlene Phan                       :        1960  MED REC NO:   [id number]                          ROOM:  ACCOUNT NO:   [de-identified]                           ADMIT DATE: 2023  PROVIDER:     Rob Chavez MD    DATE OF PROCEDURE:  2023    PREOPERATIVE DIAGNOSIS:  Bilateral inguinal hernias. POSTOPERATIVE DIAGNOSIS:  Bilateral inguinal hernias. OPERATION PERFORMED:  Laparoscopic preperitoneal bilateral inguinal  hernia repair with mesh. SURGEON:  Rob Chavez MD    GENERAL:  General plus local.    ESTIMATED BLOOD LOSS:  Minimal.    COMPLICATIONS:  None. INDICATIONS FOR SURGERY:  The patient is a 40-year-old gentleman  presenting for hernia repair today. His symptomatic right inguinal  hernia and asymptomatic left inguinal hernia noted on exam.    In terms of findings at surgery, the patient had a large direct and a  smaller indirect right inguinal hernia and a left indirect inguinal  hernia, both of which were repaired. Bard 3DMax mesh was used for the  repair. ADDITIONAL DETAILS OF SURGERY:  The patient was brought to the operating  room, placed on operating table in a supine position. Compression boots  were placed. General site was administered. The abdomen was prepped  and draped sterilely and time-out was done. Local anesthetic was placed  and a skin incision was made infraumbilically. Dissection was carried  down the level of the rectus fascia. To the right of midline, the  rectus sheath was opened and muscle was split to the side and posterior  rectus sheath was visualized. In this point, a balloon dissecting  catheter was passed down the level of pubis, inflated with 30 pumps of  bulb and then removed. The working 12-mm port was then placed at this  site.   Preperitoneal space was then insufflated with 15 mmHg pressure. Two 5-mm ports were then placed in the lower midline under direct  vision. The right-sided inguinal anatomy was identified and dissected  out first.  The direct space, indirect space and lateral space were all  cleared. There was a moderate-sized lipoma of the cord, small indirect  sac and a large direct defect which were all reduced. Lateral femoral  cutaneous nerve was identified and carefully avoided. The 3DMax mesh  was then placed for the repair. This was positioned covering the direct  space, indirect space and femoral space nicely. Mesh was tacked  medially at the pubis, inferiorly at Marcello's ligament, superiorly to  the posterior aspect of the rectus muscle above Hasselbalch's triangle. No tacks were placed out laterally or inferolaterally as to avoid nerves  and vessels in this location. Attention was then turned to the patient's left side. Similar  dissection was carried out clearing the direct, indirect and lateral  space. Again the lateral femoral cutaneous nerve was identified and  carefully avoided. The 3DMax left-sided mesh was placed for repair  after reducing indirect sac and a large lipoma of the cord on the left  side. The mesh was tacked medially to pubis, inferiorly to Marcello's  ligament, superior to the posterior aspect of the rectus muscle above  Hasselbalch's triangle. No tacks were placed out laterally or  inferolaterally as to avoid the nerves and vessels in this location on  the left side. The inferolateral corners of mesh were held down in  position. Preperitoneal space was checked for hemostasis. All was  intact. The insufflation was released. The ports were removed. The  fascia was closed at the umbilical site with an 0 Vicryl figure-of-eight  suture. Skin was closed at all sites with 4-0 Monocryl suture. Dermabond glue was placed.   The patient was taken to recovery room in  stable condition postop.         Ivett Gallagher MD    D: 03/17/2023 10:44:39       T: 03/17/2023 10:48:24     [email or web address]  Job#: [id number]     Doc#: [id number]    CC:  Julian Raymond MD

## 2023-03-17 NOTE — PROGRESS NOTES
Discharge note: Discharge order obtained, and discharge instructions reviewed. Patient educated, using the teach back method, about follow up instructions and discharge instructions. A completed copy of the AVS instructions given to patient and all questions answered. IV catheter removed without complaints, catheter intact, site WNL. Discharged to lobby via wheel chair per transportation.

## 2023-03-17 NOTE — H&P
[facility] Lapar[facility]      I have reviewed the history and physical and examined the patient and find no relevant changes. I have reviewed with the patient and/or family the risks, benefits, and alternatives to the procedure.     Laurent Fernandez MD  3/17/2023

## 2023-03-17 NOTE — DISCHARGE INSTRUCTIONS
Navdeep Neal M.D.    [phone]                                                     [place] [address] [address] [facility] [address], [address]    Call the office to schedule your post-operative appointment with your surgeon for two (2) weeks. Your incisions are waterproof, you may shower. Place an ice pack over your incisions on and off (15min) at a time for the next 2 days. General guidelines for activity:      Avoid strenuous activity or lifting anything heavier than 15 pounds. It is OK to be up and walking around. Going up and down stairs is   also OK. Do what is comfortable: stop and rest when you feel tired. You will have pain medicine ordered. Take as directed. Do NOT drive for three days and while taking your narcotic pain medicine. Watch for signs of infection:    Excessive warmth or bright redness around your incisions    Leakage of bloody or cloudy fluid from you incisions    Fever over 100.5    If you experience constipation  Increase your water intake. Increase your activity; walking is best.  A stool softener or mild laxative may be necessary if you still have not had a bowel  movement ; call the office for further instructions. acetaminophen and hydrocodone  Pronunciation:  a SEET a MIN oh fen and kathie droe KOE done  Brand:  Hycet, Lorcet, Norco, Verdrocet, Vicodin, Xodol, Zamicet  What is the most important information I should know about acetaminophen and hydrocodone? MISUSE OF OPIOID MEDICINE CAN CAUSE ADDICTION, OVERDOSE, OR DEATH. Keep the medication in a place where others cannot get to it. Taking opioid medicine during pregnancy may cause life-threatening withdrawal symptoms in the .    Fatal side effects can occur if you use opioid medicine with alcohol, or with other drugs that cause drowsiness or slow your breathing. Stop taking this medicine and call your doctor right away if you have skin redness or a rash that spreads and causes blistering and peeling. What is acetaminophen and hydrocodone? Acetaminophen and hydrocodone is a combination medicine used to relieve moderate to severe pain. Acetaminophen and hydrocodone contains an opioid medicine, and may be habit-forming. Acetaminophen and hydrocodone may also be used for purposes not listed in this medication guide. What should I discuss with my healthcare provider before taking acetaminophen and hydrocodone? You should not use this medicine if you are allergic to acetaminophen or hydrocodone, or if you have:  severe asthma or breathing problems; or  a blockage in your stomach or intestines. Tell your doctor if you have ever had:  breathing problems, sleep apnea (breathing stops during sleep);  liver disease;  a drug or alcohol addiction;  kidney disease;  a head injury or seizures;  urination problems; or  problems with your thyroid, pancreas, or gallbladder. If you use opioid medicine while you are pregnant, your baby could become dependent on the drug. This can cause life-threatening withdrawal symptoms in the baby after it is born. Babies born dependent on opioids may need medical treatment for several weeks. Ask a doctor before using opioid medicine if you are breastfeeding. Tell your doctor if you notice severe drowsiness or slow breathing in the nursing baby. How should I take acetaminophen and hydrocodone? Follow all directions on your prescription label. Never take this medicine in larger amounts, or for longer than prescribed. An overdose can damage your liver or cause death. Tell your doctor if you feel an increased urge to use more of this medicine. Never share this medicine with another person, especially someone with a history of drug abuse or addiction. MISUSE CAN CAUSE ADDICTION, OVERDOSE, OR DEATH.  Keep the medicine in a place where others cannot get to it. Selling or giving away this medicine is against the law. Measure liquid medicine carefully. Use the dosing syringe provided, or use a medicine dose-measuring device (not a kitchen spoon). If you need surgery or medical tests, tell the doctor ahead of time that you are using this medicine. You should not stop using this medicine suddenly. Follow your doctor's instructions about tapering your dose. Store at room temperature away from moisture and heat. Keep track of your medicine. You should be aware if anyone is using it improperly or without a prescription. Do not keep leftover opioid medication. Just one dose can cause death in someone using this medicine accidentally or improperly. Ask your pharmacist where to locate a drug take-back disposal program. If there is no take-back program, flush the unused medicine down the toilet. What happens if I miss a dose? Since this medicine is used for pain, you are not likely to miss a dose. Skip any missed dose if it is almost time for your next dose. Do not use two doses at one time. What happens if I overdose? Seek emergency medical attention or call the Poison Help line at 1-853.523.3538. An overdose of this medicine can be fatal, especially in a child or other person using the medicine without a prescription. Overdose symptoms may include nausea, vomiting, sweating, severe drowsiness, pinpoint pupils, slow breathing, or no breathing. Your doctor may recommend you get naloxone (a medicine to reverse an opioid overdose) and keep it with you at all times. A person caring for you can give the naloxone if you stop breathing or don't wake up. Your caregiver must still get emergency medical help and may need to perform CPR (cardiopulmonary resuscitation) on you while waiting for help to arrive. Anyone can buy naloxone from a pharmacy or local health department.  Make sure any person caring for you knows where you keep naloxone and how to use it. What should I avoid while taking acetaminophen and hydrocodone? Avoid driving or operating machinery until you know how this medicine will affect you. Dizziness or drowsiness can cause falls, accidents, or severe injuries. Do not drink alcohol. Dangerous side effects or death could occur. Ask a doctor or pharmacist before using any other medicine that may contain acetaminophen (sometimes abbreviated as APAP). Taking certain medications together can lead to a fatal overdose. What are the possible side effects of acetaminophen and hydrocodone? Get emergency medical help if you have signs of an allergic reaction: hives; difficulty breathing; swelling of your face, lips, tongue, or throat. Opioid medicine can slow or stop your breathing, and death may occur. A person caring for you should give naloxone and/or seek emergency medical attention if you have slow breathing with long pauses, blue colored lips, or if you are hard to wake up. In rare cases, acetaminophen may cause a severe skin reaction that can be fatal. This could occur even if you have taken acetaminophen in the past and had no reaction. Stop taking this medicine and call your doctor right away if you have skin redness or a rash that spreads and causes blistering and peeling. Call your doctor at once if you have:  noisy breathing, sighing, shallow breathing, breathing that stops;  a light-headed feeling, like you might pass out;  liver problems --nausea, upper stomach pain, tiredness, loss of appetite, dark urine, sheila-colored stools, jaundice (yellowing of the skin or eyes);  low cortisol levels -- nausea, vomiting, loss of appetite, dizziness, worsening tiredness or weakness; o  high levels of serotonin in the body --agitation, hallucinations, fever, sweating, shivering, fast heart rate, muscle stiffness, twitching, loss of coordination, nausea, vomiting, diarrhea.   Serious breathing problems may be more likely in older adults and in those who are debilitated or have wasting syndrome or chronic breathing disorders. Common side effects include:  dizziness, drowsiness, feeling tired;  nausea, vomiting, stomach pain;  constipation; or  headache. This is not a complete list of side effects and others may occur. Call your doctor for medical advice about side effects. You may report side effects to FDA at 0-603-RDA-4554. What other drugs will affect acetaminophen and hydrocodone? You may have breathing problems or withdrawal symptoms if you start or stop taking certain other medicines. Tell your doctor if you also use an antibiotic, antifungal medication, heart or blood pressure medication, seizure medication, or medicine to treat HIV or hepatitis C. Opioid medication can interact with many other drugs and cause dangerous side effects or death. Be sure your doctor knows if you also use:  cold or allergy medicines, bronchodilator asthma/COPD medication, or a diuretic (\"water pill\");  medicines for motion sickness, irritable bowel syndrome, or overactive bladder;  other opioids --opioid pain medicine or prescription cough medicine;  a sedative like Valium --diazepam, alprazolam, lorazepam, Xanax, Klonopin, Versed, and others;  drugs that make you sleepy or slow your breathing --a sleeping pill, muscle relaxer, medicine to treat mood disorders or mental illness;  drugs that affect serotonin levels in your body --a stimulant, or medicine for depression, Parkinson's disease, migraine headaches, serious infections, or nausea and vomiting. This list is not complete. Other drugs may affect acetaminophen and hydrocodone, including prescription and over-the-counter medicines, vitamins, and herbal products. Not all possible interactions are listed here. Where can I get more information? Your doctor or pharmacist can provide more information about acetaminophen and hydrocodone.   Remember, keep this and all other medicines out of the reach of children, never share your medicines with others, and use this medication only for the indication prescribed. Every effort has been made to ensure that the information provided by [address] is accurate, up-to-date, and complete, but no guarantee is made to that effect. Drug information contained herein may be time sensitive. [facility] information has been compiled for use by healthcare practitioners and consumers in the [place] and therefore [facility] does not warrant that uses outside of the [place] are appropriate, unless specifically indicated otherwise. [facility]'s drug information does not endorse drugs, diagnose patients or recommend therapy. [facility]'s drug information is an informational resource designed to assist licensed healthcare practitioners in caring for their patients and/or to serve consumers viewing this service as a supplement to, and not a substitute for, the expertise, skill, knowledge and judgment of healthcare practitioners. The absence of a warning for a given drug or drug combination in no way should be construed to indicate that the drug or drug combination is safe, effective or appropriate for any given patient. [facility] does not assume any responsibility for any aspect of healthcare administered with the aid of information [facility] provides. The information contained herein is not intended to cover all possible uses, directions, precautions, warnings, drug interactions, allergic reactions, or adverse effects. If you have questions about the drugs you are taking, check with your doctor, nurse or pharmacist.  Copyright [id number] [address] [place] [address] Dr RIVERA. Version: 16.03. Revision date: 2/2/2021. Care instructions adapted under license by [facility] . If you have questions about a medical condition or this instruction, always ask your healthcare professional. [place] [address] any warranty or liability for your use of this information.

## 2023-05-04 ENCOUNTER — OFFICE VISIT (OUTPATIENT)
Dept: SURGERY | Age: 63
End: 2023-05-04

## 2023-05-04 VITALS — DIASTOLIC BLOOD PRESSURE: 72 MMHG | BODY MASS INDEX: 27.98 KG/M2 | SYSTOLIC BLOOD PRESSURE: 117 MMHG | WEIGHT: 195 LBS

## 2023-05-04 DIAGNOSIS — K40.20 BILATERAL INGUINAL HERNIA WITHOUT OBSTRUCTION OR GANGRENE, RECURRENCE NOT SPECIFIED: Primary | ICD-10-CM

## 2023-05-04 PROCEDURE — 99024 POSTOP FOLLOW-UP VISIT: CPT | Performed by: SURGERY

## 2025-04-30 ENCOUNTER — HOSPITAL ENCOUNTER (EMERGENCY)
Age: 65
Discharge: LEFT BEFORE DISCHARGE | End: 2025-04-30
Attending: EMERGENCY MEDICINE
Payer: COMMERCIAL

## 2025-04-30 ENCOUNTER — APPOINTMENT (OUTPATIENT)
Age: 65
End: 2025-04-30
Payer: COMMERCIAL

## 2025-04-30 VITALS
RESPIRATION RATE: 16 BRPM | WEIGHT: 190.8 LBS | BODY MASS INDEX: 28.26 KG/M2 | SYSTOLIC BLOOD PRESSURE: 138 MMHG | HEART RATE: 69 BPM | HEIGHT: 69 IN | TEMPERATURE: 97.9 F | OXYGEN SATURATION: 96 % | DIASTOLIC BLOOD PRESSURE: 79 MMHG

## 2025-04-30 DIAGNOSIS — K80.50 CHOLEDOCHOLITHIASIS: Primary | ICD-10-CM

## 2025-04-30 LAB
ALBUMIN SERPL-MCNC: 4.2 G/DL (ref 3.4–5)
ALBUMIN/GLOB SERPL: 1.5 {RATIO}
ALP SERPL-CCNC: 174 U/L (ref 40–129)
ALT SERPL-CCNC: 496 U/L (ref 10–40)
ANION GAP SERPL CALCULATED.3IONS-SCNC: 14 MMOL/L (ref 3–16)
AST SERPL-CCNC: 172 U/L (ref 15–37)
BASOPHILS # BLD: 0.03 K/UL (ref 0–0.2)
BASOPHILS NFR BLD: 0 %
BILIRUB DIRECT SERPL-MCNC: 1.1 MG/DL (ref 0–0.3)
BILIRUB INDIRECT SERPL-MCNC: 0.7 MG/DL (ref 0–1)
BILIRUB SERPL-MCNC: 1.8 MG/DL (ref 0–1)
BILIRUB UR QL STRIP: ABNORMAL
BUN SERPL-MCNC: 19 MG/DL (ref 7–20)
CALCIUM SERPL-MCNC: 8.5 MG/DL (ref 8.3–10.6)
CHARACTER UR: ABNORMAL
CHLORIDE SERPL-SCNC: 104 MMOL/L (ref 99–110)
CLARITY UR: CLEAR
CO2 SERPL-SCNC: 24 MMOL/L (ref 21–32)
COLOR UR: YELLOW
CREAT SERPL-MCNC: 1.3 MG/DL (ref 0.8–1.3)
CRYSTALS URNS MICRO: ABNORMAL /HPF
EOSINOPHIL # BLD: 0.23 K/UL (ref 0–0.6)
EOSINOPHILS RELATIVE PERCENT: 3 %
EPI CELLS #/AREA URNS HPF: ABNORMAL /HPF
ERYTHROCYTE [DISTWIDTH] IN BLOOD BY AUTOMATED COUNT: 13.4 % (ref 12.4–15.4)
GFR, ESTIMATED: 63 ML/MIN/1.73M2
GLUCOSE SERPL-MCNC: 92 MG/DL (ref 70–99)
GLUCOSE UR STRIP-MCNC: 100 MG/DL
HCT VFR BLD AUTO: 42.2 % (ref 40.5–52.5)
HGB BLD-MCNC: 14.2 G/DL (ref 13.5–17.5)
HGB UR QL STRIP.AUTO: NEGATIVE
IMM GRANULOCYTES # BLD AUTO: 0.01 K/UL (ref 0–0.5)
IMM GRANULOCYTES NFR BLD: 0 %
KETONES UR STRIP-MCNC: NEGATIVE MG/DL
LEUKOCYTE ESTERASE UR QL STRIP: NEGATIVE
LIPASE SERPL-CCNC: 23 U/L (ref 13–60)
LYMPHOCYTES NFR BLD: 1.54 K/UL (ref 1–5.1)
LYMPHOCYTES RELATIVE PERCENT: 21 %
MCH RBC QN AUTO: 30.3 PG (ref 26–34)
MCHC RBC AUTO-ENTMCNC: 33.6 G/DL (ref 31–36)
MCV RBC AUTO: 90.2 FL (ref 80–100)
MONOCYTES NFR BLD: 0.87 K/UL (ref 0–1.3)
MONOCYTES NFR BLD: 12 %
NEUTROPHILS NFR BLD: 64 %
NEUTS SEG NFR BLD: 4.77 K/UL (ref 1.7–7.7)
NITRITE UR QL STRIP: NEGATIVE
PH UR STRIP: 6 [PH] (ref 5–8)
PLATELET # BLD AUTO: 202 K/UL (ref 135–450)
PMV BLD AUTO: 10 FL
POTASSIUM SERPL-SCNC: 3.9 MMOL/L (ref 3.5–5.1)
PROT SERPL-MCNC: 6.9 G/DL (ref 6.4–8.2)
PROT UR STRIP-MCNC: ABNORMAL MG/DL
RBC # BLD AUTO: 4.68 M/UL (ref 4.2–5.9)
RBC #/AREA URNS HPF: ABNORMAL /HPF
SODIUM SERPL-SCNC: 142 MMOL/L (ref 136–145)
SP GR UR STRIP: >1.03 (ref 1–1.03)
UROBILINOGEN UR STRIP-ACNC: 1 EU/DL (ref 0–1)
WBC #/AREA URNS HPF: ABNORMAL /HPF
WBC OTHER # BLD: 7.5 K/UL (ref 4–11)

## 2025-04-30 PROCEDURE — 74176 CT ABD & PELVIS W/O CONTRAST: CPT

## 2025-04-30 PROCEDURE — 82248 BILIRUBIN DIRECT: CPT

## 2025-04-30 PROCEDURE — 81001 URINALYSIS AUTO W/SCOPE: CPT

## 2025-04-30 PROCEDURE — 85025 COMPLETE CBC W/AUTO DIFF WBC: CPT

## 2025-04-30 PROCEDURE — 99285 EMERGENCY DEPT VISIT HI MDM: CPT

## 2025-04-30 PROCEDURE — 2580000003 HC RX 258: Performed by: EMERGENCY MEDICINE

## 2025-04-30 PROCEDURE — 80053 COMPREHEN METABOLIC PANEL: CPT

## 2025-04-30 PROCEDURE — 83690 ASSAY OF LIPASE: CPT

## 2025-04-30 RX ORDER — MORPHINE SULFATE 4 MG/ML
4 INJECTION, SOLUTION INTRAMUSCULAR; INTRAVENOUS ONCE
Refills: 0 | Status: DISCONTINUED | OUTPATIENT
Start: 2025-04-30 | End: 2025-04-30 | Stop reason: HOSPADM

## 2025-04-30 RX ORDER — 0.9 % SODIUM CHLORIDE 0.9 %
1000 INTRAVENOUS SOLUTION INTRAVENOUS ONCE
Status: COMPLETED | OUTPATIENT
Start: 2025-04-30 | End: 2025-04-30

## 2025-04-30 RX ADMIN — SODIUM CHLORIDE 1000 ML: 0.9 INJECTION, SOLUTION INTRAVENOUS at 18:30

## 2025-04-30 ASSESSMENT — PAIN DESCRIPTION - DESCRIPTORS
DESCRIPTORS: CRAMPING
DESCRIPTORS: CRAMPING

## 2025-04-30 ASSESSMENT — PAIN - FUNCTIONAL ASSESSMENT
PAIN_FUNCTIONAL_ASSESSMENT: ACTIVITIES ARE NOT PREVENTED
PAIN_FUNCTIONAL_ASSESSMENT: 0-10

## 2025-04-30 ASSESSMENT — PAIN DESCRIPTION - PAIN TYPE
TYPE: ACUTE PAIN
TYPE: ACUTE PAIN

## 2025-04-30 ASSESSMENT — PAIN DESCRIPTION - LOCATION
LOCATION: ABDOMEN
LOCATION: ABDOMEN

## 2025-04-30 ASSESSMENT — PAIN SCALES - GENERAL
PAINLEVEL_OUTOF10: 4
PAINLEVEL_OUTOF10: 4

## 2025-04-30 ASSESSMENT — PAIN DESCRIPTION - ORIENTATION
ORIENTATION: RIGHT;LEFT
ORIENTATION: LOWER

## 2025-04-30 ASSESSMENT — LIFESTYLE VARIABLES
HOW MANY STANDARD DRINKS CONTAINING ALCOHOL DO YOU HAVE ON A TYPICAL DAY: PATIENT DOES NOT DRINK
HOW OFTEN DO YOU HAVE A DRINK CONTAINING ALCOHOL: NEVER

## 2025-04-30 ASSESSMENT — PAIN DESCRIPTION - ONSET: ONSET: SUDDEN

## 2025-04-30 ASSESSMENT — PAIN DESCRIPTION - FREQUENCY: FREQUENCY: INTERMITTENT

## 2025-04-30 NOTE — ED NOTES
Introduced self to patient.     This RN into room to reassess, and to administer IV pain medication per MAR.  Patient declined IV pain medication, states he really does not like to take medications unless necessary. He reports that he has \"a dog at home that is waiting on me, because the dog will not at unless I am there\".     Pt given call light and explained how to use it. Pt instructed to call RN for any needs. Side rails up x2. Spouse at bedside. No further needs at this time.

## 2025-05-01 NOTE — ED NOTES
D/C: Order noted for d/c. Pt confirmed d/c paperwork has correct name. Discharge and education instructions reviewed with patient. Teach-back successful.  Pt verbalized understanding and denied questions at this time. No acute distress noted. Patient instructed to follow-up as noted - return to emergency department if symptoms worsen. Patient verbalized understanding. Discharged per EDMD with discharge instructions. Pt discharged to private vehicle. Patient stable upon departure. Thanked patient for [facility] for care. Provider aware of patient pain at time of discharge.

## 2025-05-01 NOTE — ED PROVIDER NOTES
I PERSONALLY SAW THE PATIENT AND PERFORMED A SUBSTANTIVE PORTION OF THE VISIT INCLUDING ALL ASPECTS OF THE MEDICAL DECISION MAKING PROCESS.    [facility] EMERGENCY DEPARTMENT  EMERGENCY DEPARTMENT ENCOUNTER      Pt Name: Arnol Moreira  MRN: [id number]  Birthdate 1960  Date of evaluation: 4/30/2025  Provider: Adalid Sanderson MD    CHIEF COMPLAINT       Chief Complaint   Patient presents with    Abdominal Pain     Pt c/o abd cramping since Sunday. Pt reports he has been struggling to have BMs. Pt denies taking anything for constipation. Pt reports decreased appetite r/t pain. Pt states that his urine is orange. Pt endorses dysuria.         HISTORY OF PRESENT ILLNESS    Arnol Moreira is a 65 y.o. male who presents to the emergency department with abdominal pain.  Right upper quadrant.  10 out of 10 in nature.  No other associated symptoms.  No leg swelling.  No rash.  No cough.    Nursing Notes were reviewed. Including nursing noted for FM, Surgical History, Past Medical History, Social History, vitals, and allergies; agree with all.     REVIEW OF SYSTEMS       Review of Systems    Except as noted above the remainder of the review of systems was reviewed and negative.     PAST MEDICAL HISTORY   History reviewed. No pertinent past medical history.    SURGICAL HISTORY       Past Surgical History:   Procedure Laterality Date    HEMORRHOID SURGERY      INGUINAL HERNIA REPAIR Bilateral 3/17/2023    LAPAROSCOPIC BILATERAL INGUINAL HERNIAS WITH MESH performed by Mac Castaneda MD at [facility] OR       CURRENT MEDICATIONS       Previous Medications    MULTIPLE VITAMIN (MULTIVITAMIN ADULT PO)    Take by mouth       ALLERGIES     Patient has no known allergies.    FAMILY HISTORY        Family History   Problem Relation Age of Onset    No Known Problems Mother        SOCIAL HISTORY       Social History     Socioeconomic History    Marital status:      Spouse name: None    Number of children: None    Years of

## 2025-05-06 ENCOUNTER — HOSPITAL ENCOUNTER (OUTPATIENT)
Age: 65
Discharge: HOME | End: 2025-05-06
Payer: COMMERCIAL

## 2025-05-06 DIAGNOSIS — R10.84 GENERALIZED ABDOMINAL PAIN: Primary | ICD-10-CM

## 2025-05-06 DIAGNOSIS — R10.84 GENERALIZED ABDOMINAL PAIN: ICD-10-CM

## 2025-05-06 DIAGNOSIS — R79.89 ELEVATED LIVER FUNCTION TESTS: ICD-10-CM

## 2025-05-06 LAB
ALBUMIN SERPL-MCNC: 4.2 G/DL (ref 3.4–5)
ALP SERPL-CCNC: 118 U/L (ref 40–129)
ALT SERPL-CCNC: 231 U/L (ref 10–40)
AST SERPL-CCNC: 41 U/L (ref 15–37)
BASOPHILS # BLD: 0 K/UL (ref 0–0.2)
BASOPHILS NFR BLD: 0.3 %
BILIRUB DIRECT SERPL-MCNC: 0.5 MG/DL (ref 0–0.3)
BILIRUB INDIRECT SERPL-MCNC: 0.4 MG/DL (ref 0–1)
BILIRUB SERPL-MCNC: 0.9 MG/DL (ref 0–1)
DEPRECATED RDW RBC AUTO: 14 % (ref 12.4–15.4)
EOSINOPHIL # BLD: 0.1 K/UL (ref 0–0.6)
EOSINOPHIL NFR BLD: 0.8 %
HCT VFR BLD AUTO: 44.6 % (ref 40.5–52.5)
HGB BLD-MCNC: 15.3 G/DL (ref 13.5–17.5)
INR PPP: 0.97 (ref 0.85–1.15)
LYMPHOCYTES # BLD: 1.5 K/UL (ref 1–5.1)
LYMPHOCYTES NFR BLD: 10.3 %
MCH RBC QN AUTO: 31 PG (ref 26–34)
MCHC RBC AUTO-ENTMCNC: 34.2 G/DL (ref 31–36)
MCV RBC AUTO: 90.7 FL (ref 80–100)
MONOCYTES # BLD: 1 K/UL (ref 0–1.3)
MONOCYTES NFR BLD: 7.1 %
NEUTROPHILS # BLD: 12 K/UL (ref 1.7–7.7)
NEUTROPHILS NFR BLD: 81.5 %
PLATELET # BLD AUTO: 232 K/UL (ref 135–450)
PMV BLD AUTO: 9.2 FL (ref 5–10.5)
PROT SERPL-MCNC: 6.9 G/DL (ref 6.4–8.2)
PROTHROMBIN TIME: 13.1 SEC (ref 11.9–14.9)
RBC # BLD AUTO: 4.92 M/UL (ref 4.2–5.9)
WBC # BLD AUTO: 14.7 K/UL (ref 4–11)

## 2025-05-06 PROCEDURE — 85610 PROTHROMBIN TIME: CPT

## 2025-05-06 PROCEDURE — 80076 HEPATIC FUNCTION PANEL: CPT

## 2025-05-06 PROCEDURE — 36415 COLL VENOUS BLD VENIPUNCTURE: CPT

## 2025-05-06 PROCEDURE — 85025 COMPLETE CBC W/AUTO DIFF WBC: CPT

## 2025-05-07 ENCOUNTER — RESULTS FOLLOW-UP (OUTPATIENT)
Dept: GASTROENTEROLOGY | Age: 65
End: 2025-05-07

## (undated) DEVICE — MERCY FAIRFIELD TURNOVER KIT: Brand: MEDLINE INDUSTRIES, INC.

## (undated) DEVICE — BLANKET WRM W29.9XL79.1IN UP BODY FORC AIR MISTRAL-AIR

## (undated) DEVICE — DRAPE,LAP,CHOLE,W/TROUGHS,STERILE: Brand: MEDLINE

## (undated) DEVICE — ADHESIVE SKIN CLSR 0.7ML TOP DERMBND ADV

## (undated) DEVICE — LAPAROSCOPY PACK: Brand: MEDLINE INDUSTRIES, INC.

## (undated) DEVICE — KIT BAL DISECT KII LO PROF OVL 5MMX55MM

## (undated) DEVICE — 3M™ IOBAN™ 2 ANTIMICROBIAL INCISE DRAPE 6650EZ: Brand: IOBAN™ 2

## (undated) DEVICE — SYRINGE MED 10ML TRNSLUC BRL PLUNG BLK MRK POLYPR CTRL

## (undated) DEVICE — APPLICATOR MEDICATED 26 CC SOLUTION HI LT ORNG CHLORAPREP

## (undated) DEVICE — SUTURE MCRYL + SZ 4-0 L27IN ABSRB UD L19MM PS-2 3/8 CIR MCP426H

## (undated) DEVICE — GLOVE SURG SZ 6.5 L11.2IN FNGR THK9.8MIL STRW LTX POLYMER

## (undated) DEVICE — SUTURE VCRL + SZ 0 L27IN ABSRB VLT L26MM UR-6 5/8 CIR VCP603H

## (undated) DEVICE — BLADE CLIPPER GEN PURP NS

## (undated) DEVICE — COVER LT HNDL BLU PLAS

## (undated) DEVICE — SOLUTION IRRIG 1000ML 0.9% SOD CHL USP POUR PLAS BTL

## (undated) DEVICE — NEEDLE,22GX1.5",REG,BEVEL: Brand: MEDLINE

## (undated) DEVICE — DRAPE,REIN 53X77,STERILE: Brand: MEDLINE

## (undated) DEVICE — CORD ES L10FT MPLR LAP

## (undated) DEVICE — INSTRUMENT WARMER: Brand: SCOPE WARMER DISPOSABLE SEAL

## (undated) DEVICE — GOWN SIRUS NONREIN LG W/TWL: Brand: MEDLINE INDUSTRIES, INC.